# Patient Record
Sex: FEMALE | Race: WHITE | NOT HISPANIC OR LATINO | URBAN - METROPOLITAN AREA
[De-identification: names, ages, dates, MRNs, and addresses within clinical notes are randomized per-mention and may not be internally consistent; named-entity substitution may affect disease eponyms.]

---

## 2022-05-06 ENCOUNTER — EMERGENCY (EMERGENCY)
Facility: HOSPITAL | Age: 22
LOS: 0 days | Discharge: HOME | End: 2022-05-06
Attending: PEDIATRICS | Admitting: PEDIATRICS
Payer: COMMERCIAL

## 2022-05-06 VITALS
SYSTOLIC BLOOD PRESSURE: 141 MMHG | RESPIRATION RATE: 20 BRPM | HEART RATE: 117 BPM | DIASTOLIC BLOOD PRESSURE: 76 MMHG | OXYGEN SATURATION: 99 % | WEIGHT: 146.17 LBS | TEMPERATURE: 99 F

## 2022-05-06 VITALS
SYSTOLIC BLOOD PRESSURE: 124 MMHG | HEART RATE: 98 BPM | TEMPERATURE: 99 F | DIASTOLIC BLOOD PRESSURE: 60 MMHG | OXYGEN SATURATION: 98 % | RESPIRATION RATE: 18 BRPM

## 2022-05-06 DIAGNOSIS — R11.10 VOMITING, UNSPECIFIED: ICD-10-CM

## 2022-05-06 DIAGNOSIS — R19.7 DIARRHEA, UNSPECIFIED: ICD-10-CM

## 2022-05-06 DIAGNOSIS — J11.1 INFLUENZA DUE TO UNIDENTIFIED INFLUENZA VIRUS WITH OTHER RESPIRATORY MANIFESTATIONS: ICD-10-CM

## 2022-05-06 DIAGNOSIS — R05.1 ACUTE COUGH: ICD-10-CM

## 2022-05-06 DIAGNOSIS — R11.2 NAUSEA WITH VOMITING, UNSPECIFIED: ICD-10-CM

## 2022-05-06 PROCEDURE — 99283 EMERGENCY DEPT VISIT LOW MDM: CPT

## 2022-05-06 RX ORDER — DEXAMETHASONE 0.5 MG/5ML
10 ELIXIR ORAL ONCE
Refills: 0 | Status: COMPLETED | OUTPATIENT
Start: 2022-05-06 | End: 2022-05-06

## 2022-05-06 RX ORDER — IBUPROFEN 200 MG
400 TABLET ORAL ONCE
Refills: 0 | Status: COMPLETED | OUTPATIENT
Start: 2022-05-06 | End: 2022-05-06

## 2022-05-06 RX ORDER — ONDANSETRON 8 MG/1
1 TABLET, FILM COATED ORAL
Qty: 9 | Refills: 0
Start: 2022-05-06 | End: 2022-05-08

## 2022-05-06 RX ORDER — ONDANSETRON 8 MG/1
4 TABLET, FILM COATED ORAL ONCE
Refills: 0 | Status: COMPLETED | OUTPATIENT
Start: 2022-05-06 | End: 2022-05-06

## 2022-05-06 RX ADMIN — Medication 400 MILLIGRAM(S): at 15:38

## 2022-05-06 RX ADMIN — ONDANSETRON 4 MILLIGRAM(S): 8 TABLET, FILM COATED ORAL at 15:58

## 2022-05-06 RX ADMIN — Medication 10 MILLIGRAM(S): at 15:35

## 2022-05-06 NOTE — ED PEDIATRIC NURSE NOTE - OBJECTIVE STATEMENT
Patient has had congested nose, head and ears for three days. Since last night had multiple episodes of vomiting and some bouts of diarrhea.

## 2022-05-06 NOTE — ED PROVIDER NOTE - OBJECTIVE STATEMENT
20 yo f no pmh pw flu. Pt dx w flu yday. Admits roommate had flu last week. Admits to vomiting, diarrhea, cough x 2 days. Denies recent travels, ha, abd pain, dysuria, frequency

## 2022-05-06 NOTE — ED PEDIATRIC NURSE NOTE - CHIEF COMPLAINT QUOTE
"I have the flu. last night I started throwing and I cant keep anything down I feel like my throat is swollen and I cant swallow" pt took mucinex cold anf flu today

## 2022-05-06 NOTE — ED PROVIDER NOTE - ATTENDING CONTRIBUTION TO CARE
I personally evaluated the patient. I reviewed the Resident’s or Physician Assistant’s note (as assigned above), and agree with the findings and plan except as documented in my note. 21-year-old female presents to the ED for evaluation of nausea, sore throat and body aches.  She was diagnosed with the flu yesterday.  She is taking pain medication but denies any measured fever.  Physical Exam: VS reviewed. Pt is well appearing, in no respiratory distress. MMM. Cap refill <2 seconds.  Eyes normal with no injection, no discharge, EOMI.  Pharynx with + erythema, no exudates, no stomatitis. + BL anterior cervical lymph nodes appreciated. Skin with no rash noted.  Chest is clear, no wheezing, rales or crackles. No retractions, no distress. Normal and equal breath sounds. Normal heart sounds, no muffling, no murmur appreciated. Abdomen soft, ND, no guarding, no localized tenderness.  Neuro exam grossly intact. Plan:  Zofran, Decadron, Motrin, will reassess.

## 2022-05-06 NOTE — ED PROVIDER NOTE - NS ED ROS FT
Constitutional:  See HPI  Eyes:  No visual changes  ENMT: No neck pain or stiffness  Cardiac:  No chest pain  Respiratory:  No respiratory distress.   GI:  +nv  :  No dysuria, frequency or burning.  MS:  No back pain.  Neuro:  No headache   Skin:  No skin rash  Except as documented in the HPI,  all other systems are negative

## 2022-05-06 NOTE — ED PROVIDER NOTE - PROGRESS NOTE DETAILS
SS: Tolerated PO intake, spoke in length w/ pt and mother via Facetime regarding return precautions and importance of PO intake including BRAT diet for sxs. Pt and mother expressed understanding and agreement w plan

## 2022-05-06 NOTE — ED PROVIDER NOTE - PATIENT PORTAL LINK FT
You can access the FollowMyHealth Patient Portal offered by [facility] by registering at the following website: [email or web address]. By joining [facility]’s FollowMyHealth portal, you will also be able to view your health information using other applications (apps) compatible with our system.

## 2022-05-06 NOTE — ED PROVIDER NOTE - NSFOLLOWUPINSTRUCTIONS_ED_ALL_ED_FT
Acute Nausea and Vomiting    WHAT YOU NEED TO KNOW:  Acute nausea and vomiting start suddenly, worsen quickly, and last a short time.    DISCHARGE INSTRUCTIONS:    Return to the emergency department if:   You see blood in your vomit or your bowel movements.  You have sudden, severe pain in your chest and upper abdomen after hard vomiting or retching.  You have swelling in your neck and chest.   You are dizzy, cold, and thirsty and your eyes and mouth are dry.  You are urinating very little or not at all.  You have muscle weakness, leg cramps, and trouble breathing.   Your heart is beating much faster than normal.       You continue to vomit for more than 48 hours.     Contact your healthcare provider if:   You have frequent dry heaves (vomiting but nothing comes out).  Your nausea and vomiting does not get better or go away after you use medicine.  You have questions or concerns about your condition or treatment.    Medicines: You may need any of the following:   Medicines may be given to calm your stomach and stop your vomiting. You may also need medicines to help you feel more relaxed or to stop nausea and vomiting caused by motion sickness.  Gastrointestinal stimulants are used to help empty your stomach and bowels. This may help decrease nausea and vomiting.  Take your medicine as directed. Contact your healthcare provider if you think your medicine is not helping or if you have side effects. Tell him or her if you are allergic to any medicine. Keep a list of the medicines, vitamins, and herbs you take. Include the amounts, and when and why you take them. Bring the list or the pill bottles to follow-up visits. Carry your medicine list with you in case of an emergency.    Prevent or manage acute nausea and vomiting:   Do not drink alcohol. Alcohol may upset or irritate your stomach. Too much alcohol can also cause acute nausea and vomiting.  Control stress. Headaches due to stress may cause nausea and vomiting. Find ways to relax and manage your stress. Get more rest and sleep  Drink more liquids as directed. Vomiting can lead to dehydration. It is important to drink more liquids to help replace lost body fluids. Ask your healthcare provider how much liquid to drink each day and which liquids are best for you. Your provider may recommend that you drink an oral rehydration solution (ORS). ORS contains water, salts, and sugar that are needed to replace the lost body fluids. Ask what kind of ORS to use, how much to drink, and where to get it.  Eat smaller meals, more often. Eat small amounts of food every 2 to 3 hours, even if you are not hungry. Food in your stomach may decrease your nausea.  Talk to your healthcare provider before you take over-the-counter (OTC) medicines. These medicines can cause serious problems if you use certain other medicines, or you have a medical condition. You may have problems if you use too much or use them for longer than the label says. Follow directions on the label carefully.     Follow up with your healthcare provider as directed: Write down your questions so you remember to ask them during your follow-up visits.      Your child was diagnosed with Influenza, also knon as the flu. Please continue with Tamiflu as prescribed. Be sure to drink plenty of water, in order to avoid dehydration. Your child can take over the counter medications like Tylenol, or antiinflammatories like Ibuprofen for fevers and body pains. It will take some time before your child feels 100% again, this is ok. You should seek care from your primary care doctor only if your child continues to have high grade fevers or if you feel like the symptoms have actually gotten worse.   Despite having been diagnosed with the Flu, be sure to get your child flu shot every year - the purpose of the shot is not mainly to prevent it although sometimes it does, but really it is to keep your flu from becoming very severe.

## 2022-05-06 NOTE — ED PROVIDER NOTE - CLINICAL SUMMARY MEDICAL DECISION MAKING FREE TEXT BOX
21-year-old female presents to the ED for evaluation of nausea, sore throat and body aches.  She was diagnosed with the flu yesterday.  She is taking pain medication but denies any measured fever.  Physical Exam: VS reviewed. Pt is well appearing, in no respiratory distress. MMM. Cap refill <2 seconds.  Eyes normal with no injection, no discharge, EOMI.  Pharynx with + erythema, no exudates, no stomatitis. + BL anterior cervical lymph nodes appreciated. Skin with no rash noted.  Chest is clear, no wheezing, rales or crackles. No retractions, no distress. Normal and equal breath sounds. Normal heart sounds, no muffling, no murmur appreciated. Abdomen soft, ND, no guarding, no localized tenderness.  Neuro exam grossly intact. Plan:  Zofran, Decadron, Motrin, reassessed.  PMD follow up advised.

## 2022-07-22 NOTE — ED PEDIATRIC NURSE NOTE - NSSUHOSCREENINGYN_ED_ALL_ED
Last OV - 3/29  Next OV - n/a      Last written - 3/29, #30, 3 refills Yes - the patient is able to be screened
